# Patient Record
Sex: FEMALE | Race: BLACK OR AFRICAN AMERICAN | NOT HISPANIC OR LATINO | URBAN - METROPOLITAN AREA
[De-identification: names, ages, dates, MRNs, and addresses within clinical notes are randomized per-mention and may not be internally consistent; named-entity substitution may affect disease eponyms.]

---

## 2023-10-03 ENCOUNTER — CLINICAL SUPPORT (OUTPATIENT)
Dept: OTHER | Facility: CLINIC | Age: 58
End: 2023-10-03

## 2023-10-03 DIAGNOSIS — Z00.8 ENCOUNTER FOR OTHER GENERAL EXAMINATION: ICD-10-CM

## 2023-10-04 VITALS
DIASTOLIC BLOOD PRESSURE: 82 MMHG | HEIGHT: 64 IN | BODY MASS INDEX: 40.12 KG/M2 | SYSTOLIC BLOOD PRESSURE: 143 MMHG | WEIGHT: 235 LBS

## 2023-10-04 LAB
GLUCOSE SERPL-MCNC: 89 MG/DL (ref 60–140)
HDLC SERPL-MCNC: 35 MG/DL
POC CHOLESTEROL, LDL (DOCK): 93 MG/DL
POC CHOLESTEROL, TOTAL: 158 MG/DL
TRIGL SERPL-MCNC: 175 MG/DL

## 2025-06-23 DIAGNOSIS — M25.552 LEFT HIP PAIN: Primary | ICD-10-CM

## 2025-07-01 ENCOUNTER — CLINICAL SUPPORT (OUTPATIENT)
Dept: REHABILITATION | Facility: HOSPITAL | Age: 60
End: 2025-07-01

## 2025-07-01 DIAGNOSIS — M54.42 CHRONIC BILATERAL LOW BACK PAIN WITH BILATERAL SCIATICA: ICD-10-CM

## 2025-07-01 DIAGNOSIS — M54.59 OTHER LOW BACK PAIN: Primary | ICD-10-CM

## 2025-07-01 DIAGNOSIS — G89.29 CHRONIC BILATERAL LOW BACK PAIN WITH BILATERAL SCIATICA: ICD-10-CM

## 2025-07-01 DIAGNOSIS — M54.41 CHRONIC BILATERAL LOW BACK PAIN WITH BILATERAL SCIATICA: ICD-10-CM

## 2025-07-01 PROCEDURE — 97813 ACUP 1/> W/ESTIM 1ST 15 MIN: CPT | Mod: PN

## 2025-07-01 PROCEDURE — 97814 ACUP 1/> W/ESTIM EA ADDL 15: CPT | Mod: PN

## 2025-07-01 NOTE — PROGRESS NOTES
"  Acupuncture Evaluation Note     Name: Summer Yeh  Clinic Number: [id number]    Traditional Chinese Medicine (TCM) Diagnosis: Qi Stagnation and Blood Stasis  Medical Diagnosis: Other low back pain    Evaluation Date: 7/1/2025    Visit #/Visits authorized: [unfilled] 1 / 12    Precautions: Standard    Subjective     Chief Concern: Bilateral lower back pain radiating down to both legs    Cancer Related Symptoms: None    Medical necessity is demonstrated by the following IMPAIRMENTS: Medical Necessity: Decreased mobility limits day to day activities, social, and emergent situations and Decreased quality of life                Aggravating Factors:  prolonged sitting and changing positions   Relieving Factors:  rest and massage, ice    Symptom Description:     Quality:  Burning and Throbbing  Severity:  7  Frequency:  continuously    Previous Treatments Tried:  Injection(s), Medication, Imaging, and Therapy     HEENT:  WNL    Chest:  WNL    Digestion:     Diet: in general, a "healthy" diet     Fluids: coffee 1 /day, is drinking plenty of fluids, social drinker  Taste/Appetite: Good   Symptoms: none    Sleep: Insomnia     Energy Levels:  9 / 10    Psychological Symptoms:  Anxiety and Depression    Other Symptoms: None    GYN Symptoms: None    Objective     Observation: Looks healthy    Tongue:      Body:  normal   Color:  pink   Coating:  thin,  and white,    Pulse:        deep       New Findings:  None    Treatment     Treatment Principles:  Increase Qi and Blood    Acupuncture points used:    Bilateral points: UB 54, GB 30, Lisa on gluteal muscle + 4, GB 31, GB 33, GB 34, GB 37, GB 39, SI 9  Unilateral points:  Left:  Right:  EA: Right: UB 54, GB 30, Lisa on gluteal muscle + 2  EA: Right: GB 31, GB 34, GB 37, GB 39  EA: Right: UB 54, GB 30, Lisa on gluteal muwscle + 2  EA: Right: GB 31, GB 34, GB 37, GB 39  Auricular Treatment:  None  Needles In: 24  Needles Out: 24  Elizabeth W/ STIM placed: 2: 00 PM  Elizabeth W/ STIM " removed: 3: 00 PM      Other Traditional Chinese Medicine Modalities - None    Assessment     After treatment, patient felt good     Patient prognosis is Good.     Patient will continue to benefit from acupuncture treatment to address the deficits listed in the problem list box on initial evaluation, provide patient family education and to maximize pt's level of independence in the home and community environment.     Patient's spiritual, cultural and educational needs considered and pt agreeable to plan of care and goals.     Anticipated barriers to treatment: None    Plan     Recommend 1 /week for 12 sessions then re-assess.      Education:  Patient is aware of cumulative benefit of acupuncture

## 2025-08-20 DIAGNOSIS — N85.00 ENDOMETRIAL HYPERPLASIA: Primary | ICD-10-CM

## 2025-08-20 DIAGNOSIS — D25.9 UTERINE LEIOMYOMA, UNSPECIFIED LOCATION: ICD-10-CM

## 2025-08-20 DIAGNOSIS — N95.0 POSTMENOPAUSAL BLEEDING: ICD-10-CM

## 2025-08-28 ENCOUNTER — OFFICE VISIT (OUTPATIENT)
Dept: OBSTETRICS AND GYNECOLOGY | Facility: CLINIC | Age: 60
End: 2025-08-28
Payer: COMMERCIAL

## 2025-08-28 VITALS
HEART RATE: 80 BPM | SYSTOLIC BLOOD PRESSURE: 155 MMHG | WEIGHT: 235.88 LBS | DIASTOLIC BLOOD PRESSURE: 85 MMHG | BODY MASS INDEX: 40.49 KG/M2

## 2025-08-28 DIAGNOSIS — R93.89 THICKENED ENDOMETRIUM: Primary | ICD-10-CM

## 2025-08-28 PROCEDURE — 99999 PR PBB SHADOW E&M-EST. PATIENT-LVL III: CPT | Mod: PBBFAC,,, | Performed by: STUDENT IN AN ORGANIZED HEALTH CARE EDUCATION/TRAINING PROGRAM

## 2025-08-28 RX ORDER — AMLODIPINE BESYLATE 10 MG/1
10 TABLET ORAL
COMMUNITY
Start: 2025-08-20

## 2025-08-28 RX ORDER — VALSARTAN 80 MG/1
80 TABLET ORAL
COMMUNITY

## 2025-08-28 RX ORDER — IBUPROFEN 800 MG/1
800 TABLET, FILM COATED ORAL EVERY 8 HOURS PRN
Qty: 30 TABLET | Refills: 2 | Status: SHIPPED | OUTPATIENT
Start: 2025-08-28 | End: 2026-08-28

## 2025-08-28 RX ORDER — TRAMADOL HYDROCHLORIDE 50 MG/1
50 TABLET, FILM COATED ORAL EVERY 6 HOURS PRN
Qty: 2 TABLET | Refills: 0 | Status: SHIPPED | OUTPATIENT
Start: 2025-08-28